# Patient Record
Sex: FEMALE | Race: WHITE | Employment: STUDENT | URBAN - METROPOLITAN AREA
[De-identification: names, ages, dates, MRNs, and addresses within clinical notes are randomized per-mention and may not be internally consistent; named-entity substitution may affect disease eponyms.]

---

## 2022-05-31 ENCOUNTER — OFFICE VISIT (OUTPATIENT)
Dept: GYNECOLOGY | Age: 22
End: 2022-05-31
Payer: COMMERCIAL

## 2022-05-31 VITALS
DIASTOLIC BLOOD PRESSURE: 90 MMHG | BODY MASS INDEX: 45.71 KG/M2 | SYSTOLIC BLOOD PRESSURE: 124 MMHG | WEIGHT: 258 LBS | HEIGHT: 63 IN

## 2022-05-31 DIAGNOSIS — N94.6 DYSMENORRHEA: ICD-10-CM

## 2022-05-31 DIAGNOSIS — N92.1 MENORRHAGIA WITH IRREGULAR CYCLE: Primary | ICD-10-CM

## 2022-05-31 DIAGNOSIS — Z30.016 ENCOUNTER FOR INITIAL PRESCRIPTION OF TRANSDERMAL PATCH HORMONAL CONTRACEPTIVE DEVICE: ICD-10-CM

## 2022-05-31 PROCEDURE — 99214 OFFICE O/P EST MOD 30 MIN: CPT | Performed by: OBSTETRICS & GYNECOLOGY

## 2022-05-31 RX ORDER — NORELGESTROMIN AND ETHINYL ESTRADIOL 150; 35 UG/D; UG/D
PATCH TRANSDERMAL
Qty: 3 PATCH | Refills: 3 | Status: SHIPPED | OUTPATIENT
Start: 2022-05-31 | End: 2022-07-07 | Stop reason: SDUPTHER

## 2022-05-31 NOTE — PROGRESS NOTES
Vision, Double Vision, Glaucoma, Visual Disturbances, Hearing Loss, Ringing in the Ears, Vertigo, Nose Bleed, Bleeding Gums, Hoarseness and Sore Throat. Neck: Not Present- Neck Pain and Neck Swelling. Respiratory: Not Present- Cough, Difficulty Breathing and Difficulty Breathing on Exertion. Breast: Not Present- Breast Mass, Breast Pain, Breast Swelling, Nipple Discharge, Nipple Pain, Recent Breast Size Changes and Skin Changes. Cardiovascular: Not Present- Abnormal Blood Pressure, Chest Pain, Edema, Fainting / Blacking Out, Palpitations, Shortness of Breath and Swelling of Extremities. Gastrointestinal: Not Present- Abdominal Pain, Abdominal Swelling, Bloating, Change in Bowel Habits, Constipation, Diarrhea, Difficulty Swallowing, Gets full quickly at meals, Nausea, Rectal Bleeding and Vomiting. Female Genitourinary: Not Present- Dysmenorrhea, Dyspareunia, Decreased libido, Excessive Menstrual Bleeding, Menstrual Irregularities, Pelvic Pain, Urinary Complaints, Vaginal Discharge, Vaginal itching/burning, Vaginal odor  Musculoskeletal: Not Present- Joint Pain and Muscle Pain. Neurological: Not Present- Dizziness, Fainting, Headaches and Seizures. Psychiatric: Not Present- Anxiety, Depression, Mood changes and Panic Attacks. Endocrine: Not Present- Appetite Changes, Cold Intolerance, Excessive Thirst, Excessive Urination and Heat Intolerance. Hematology: Not Present- Abnormal Bleeding, Easy Bruising and Enlarged Lymph Nodes. PHYSICAL EXAM:    BP (!) 124/90   Ht 5' 3\" (1.6 m)   Wt 258 lb (117 kg)   LMP 05/28/2022   BMI 45.70 kg/m²     Constitutional: Vital signs are normal. She appears well-developed and well-nourished. She is active and cooperative. Neurological: She is alert. Nursing note and vitals reviewed. Medical problems and test results were reviewed with the patient today. ASSESSMENT and PLAN    Kaela Rowell was seen today for contraception.     Diagnoses and all orders for this visit: Menorrhagia with irregular cycle    Dysmenorrhea    Encounter for initial prescription of transdermal patch hormonal contraceptive device  -     norelgestromin-ethinyl estradiol (Elgie Felton) 150-35 MCG/24HR; 1 patch once a week x 3 weeks off x 1 week              Time:  I spent  30 minutes in preparing to see patient (including chart review and preparation), obtaining and/or reviewing additional medical history, performing a physical exam and evaluation, documenting clinical information in the electronic health record, independently interpreting results, communicating results to patient, family or caregiver, and/or coordinating care. No follow-up provider specified.         Jay Corcoran MD

## 2022-07-07 DIAGNOSIS — Z30.016 ENCOUNTER FOR INITIAL PRESCRIPTION OF TRANSDERMAL PATCH HORMONAL CONTRACEPTIVE DEVICE: ICD-10-CM

## 2022-07-07 RX ORDER — NORELGESTROMIN AND ETHINYL ESTRADIOL 150; 35 UG/D; UG/D
PATCH TRANSDERMAL
Qty: 3 PATCH | Refills: 11 | Status: SHIPPED | OUTPATIENT
Start: 2022-07-07

## 2022-08-17 RX ORDER — NORETHINDRONE ACETATE AND ETHINYL ESTRADIOL 1.5-30(21)
1 KIT ORAL DAILY
Qty: 1 PACKET | Refills: 11 | Status: SHIPPED | OUTPATIENT
Start: 2022-08-17

## 2022-08-17 NOTE — TELEPHONE ENCOUNTER
Pt called was started on the birth control patches at last visit. She is c/o irregular bleeding and increased migraines. She is wanting to go back on the birth control pills. New prescription to pharmacy.

## 2022-11-27 ENCOUNTER — HOSPITAL ENCOUNTER (EMERGENCY)
Age: 22
Discharge: HOME | End: 2022-11-27
Attending: GENERAL PRACTICE
Payer: COMMERCIAL

## 2022-11-27 VITALS
RESPIRATION RATE: 18 BRPM | BODY MASS INDEX: 39.87 KG/M2 | HEIGHT: 63 IN | DIASTOLIC BLOOD PRESSURE: 90 MMHG | WEIGHT: 225 LBS | TEMPERATURE: 98.3 F | OXYGEN SATURATION: 99 % | SYSTOLIC BLOOD PRESSURE: 133 MMHG | HEART RATE: 88 BPM

## 2022-11-27 DIAGNOSIS — R07.89 ATYPICAL CHEST PAIN: Primary | ICD-10-CM

## 2022-11-27 DIAGNOSIS — F43.0 STRESS REACTION: ICD-10-CM

## 2022-11-27 LAB
EKG ATRIAL RATE: 82 BPM
EKG DIAGNOSIS: NORMAL
EKG P AXIS: 43 DEGREES
EKG P-R INTERVAL: 122 MS
EKG Q-T INTERVAL: 380 MS
EKG QRS DURATION: 90 MS
EKG QTC CALCULATION (BAZETT): 443 MS
EKG R AXIS: 47 DEGREES
EKG T AXIS: 23 DEGREES
EKG VENTRICULAR RATE: 82 BPM

## 2022-11-27 PROCEDURE — 93005 ELECTROCARDIOGRAM TRACING: CPT

## 2022-11-27 PROCEDURE — 99283 EMERGENCY DEPT VISIT LOW MDM: CPT | Performed by: GENERAL PRACTICE

## 2022-11-27 ASSESSMENT — PAIN - FUNCTIONAL ASSESSMENT: PAIN_FUNCTIONAL_ASSESSMENT: NONE - DENIES PAIN

## 2022-11-27 NOTE — ED TRIAGE NOTES
Patient to triage with c/o chest pain with panic attacks over the last week with vomiting, states she has been overly anxious over the week and chest hurts upon inspiration when having an attack.

## 2022-11-27 NOTE — ED PROVIDER NOTES
Emergency Department Provider Note                   PCP:                Antonella Soliman MD               Age: 22 y.o. Sex: female     No diagnosis found. DISPOSITION          MDM  Number of Diagnoses or Management Options  Atypical chest pain: new, needed workup  Stress reaction: new, needed workup  Diagnosis management comments: Patient presents with atypical chest pain. However, patient is not currently having any symptoms of chest pain or dyspnea. Patient admits to having panic attacks. I have considered cardiopulmonary emergencies such as ACS, PE, pneumonia, among other cardiopulmonary emergencies. Patient does not appear to have any emergent pathology at this time. Patient will be treated symptomatically and expectantly. Patient encouraged to follow-up with primary care and return precautions are given. Amount and/or Complexity of Data Reviewed  Independent visualization of images, tracings, or specimens: yes    Risk of Complications, Morbidity, and/or Mortality  Presenting problems: low  Diagnostic procedures: low  Management options: low    Patient Progress  Patient progress: stable             Orders Placed This Encounter   Procedures    EKG 12 Lead        Medications - No data to display    New Prescriptions    No medications on file        Anni Reed is a 22 y.o. female who presents to the Emergency Department with chief complaint of    Chief Complaint   Patient presents with    Panic Attack    Chest Pain      Patient presents with substernal chest pain that she has had off and on for a week. Patient states that she gets panic attacks and she starts breathing heavy and when she does that it hurts her chest.  She denies any radiation of the pain. She states that it is a dull ache in her substernal area without radiation to the neck or jaw or back. Patient currently does not have any chest pain or shortness of breath.   She does feel mildly anxious but not having any panic feeling. Patient does not feel depressed and does not have any suicidal ideations. Patient denies any palpitations, syncope, cough, or dyspnea on exertion. Past medical history otherwise negative. Patient does take birth control pills. Social history is negative for smoking or drinking. Review of Systems   All other systems reviewed and are negative. Past Medical History:   Diagnosis Date    Depression         History reviewed. No pertinent surgical history. Family History   Problem Relation Age of Onset    Diabetes Mother     Diabetes Maternal Grandfather     Heart Disease Maternal Grandfather         Social History     Socioeconomic History    Marital status: Single     Spouse name: None    Number of children: None    Years of education: None    Highest education level: None   Tobacco Use    Smoking status: Never    Smokeless tobacco: Never   Substance and Sexual Activity    Alcohol use: Never    Drug use: Never    Sexual activity: Not Currently     Birth control/protection: None         Patient has no known allergies. Previous Medications    NORELGESTROMIN-ETHINYL ESTRADIOL (XULANE) 150-35 MCG/24HR    1 patch once a week x 3 weeks off x 1 week    NORETHINDRONE-ETHINYL ESTRADIOL-IRON (LOESTRIN FE 1.5/30) 1.5-30 MG-MCG TABLET    Take 1 tablet by mouth daily    NORETHINDRONE-ETHINYL ESTRADIOL-IRON (MICROGESTIN FE1.5/30) 1.5-30 MG-MCG TABLET    Take 1 tablet by mouth daily        Vitals signs and nursing note reviewed. Patient Vitals for the past 4 hrs:   Temp Pulse Resp BP SpO2   11/27/22 0100 98.3 °F (36.8 °C) 88 18 (!) 133/90 99 %          Physical Exam  Constitutional:       General: She is not in acute distress. HENT:      Head: Normocephalic and atraumatic. Nose: Nose normal.      Mouth/Throat:      Mouth: Mucous membranes are moist.   Eyes:      Extraocular Movements: Extraocular movements intact. Pupils: Pupils are equal, round, and reactive to light.    Cardiovascular: Rate and Rhythm: Normal rate and regular rhythm. Heart sounds: Normal heart sounds. Pulmonary:      Effort: No respiratory distress. Breath sounds: No wheezing. Abdominal:      General: Abdomen is flat. Palpations: Abdomen is soft. Tenderness: There is no abdominal tenderness. Musculoskeletal:         General: No swelling or tenderness. Cervical back: Normal range of motion. Skin:     Findings: No erythema or rash. Neurological:      General: No focal deficit present. Mental Status: She is oriented to person, place, and time. Psychiatric:         Mood and Affect: Mood normal.         Behavior: Behavior normal.        EKG 12 Lead    Date/Time: 11/27/2022 1:03 AM  Performed by: Anusha Alonso DO  Authorized by: Anusha Alonso DO     ECG reviewed by ED Physician in the absence of a cardiologist: yes    Interpretation:     Interpretation: normal    Rate:     ECG rate:  82    ECG rate assessment: normal    Rhythm:     Rhythm: sinus rhythm    ST segments:     ST segments:  Normal    No results found for any visits on 11/27/22. No orders to display                       Voice dictation software was used during the making of this note. This software is not perfect and grammatical and other typographical errors may be present. This note has not been completely proofread for errors.      Anusha Alonso DO  11/27/22 6628

## 2022-11-27 NOTE — ED NOTES
I have reviewed discharge instructions with the patient. The patient verbalized understanding. Patient left ED via Discharge Method: ambulatory to Home with self  . Opportunity for questions and clarification provided. Patient given 0 scripts. To continue your aftercare when you leave the hospital, you may receive an automated call from our care team to check in on how you are doing. This is a free service and part of our promise to provide the best care and service to meet your aftercare needs.  If you have questions, or wish to unsubscribe from this service please call [phone]. Thank you for Choosing our [address] [place] [address] Emergency Department.        Anish Mcginnis RN  11/27/22 8330

## 2023-07-10 ENCOUNTER — OFFICE VISIT (OUTPATIENT)
Dept: OBGYN CLINIC | Age: 23
End: 2023-07-10
Payer: COMMERCIAL

## 2023-07-10 VITALS
HEIGHT: 63 IN | DIASTOLIC BLOOD PRESSURE: 84 MMHG | WEIGHT: 225 LBS | SYSTOLIC BLOOD PRESSURE: 122 MMHG | BODY MASS INDEX: 39.87 KG/M2

## 2023-07-10 DIAGNOSIS — Z12.4 CERVICAL CANCER SCREENING: ICD-10-CM

## 2023-07-10 DIAGNOSIS — R21 RASH: ICD-10-CM

## 2023-07-10 DIAGNOSIS — N92.1 MENORRHAGIA WITH IRREGULAR CYCLE: ICD-10-CM

## 2023-07-10 DIAGNOSIS — Z01.419 ENCOUNTER FOR WELL WOMAN EXAM WITH ROUTINE GYNECOLOGICAL EXAM: Primary | ICD-10-CM

## 2023-07-10 PROCEDURE — 99395 PREV VISIT EST AGE 18-39: CPT | Performed by: OBSTETRICS & GYNECOLOGY

## 2023-07-10 RX ORDER — NORETHINDRONE ACETATE AND ETHINYL ESTRADIOL 1.5-30(21)
1 KIT ORAL DAILY
Qty: 1 PACKET | Refills: 11 | Status: SHIPPED | OUTPATIENT
Start: 2023-07-10

## 2023-07-10 RX ORDER — FLUCONAZOLE 150 MG/1
TABLET ORAL
Qty: 2 TABLET | Refills: 2 | Status: SHIPPED | OUTPATIENT
Start: 2023-07-10

## 2023-07-10 RX ORDER — NORETHINDRONE ACETATE AND ETHINYL ESTRADIOL 1.5-30(21)
1 KIT ORAL DAILY
Qty: 1 PACKET | Refills: 11 | Status: SHIPPED | OUTPATIENT
Start: 2023-07-10 | End: 2023-07-10 | Stop reason: SDUPTHER

## 2023-07-10 RX ORDER — NORETHINDRONE ACETATE AND ETHINYL ESTRADIOL AND FERROUS FUMARATE 1.5-30(21)
KIT ORAL
Qty: 28 TABLET | Refills: 11 | OUTPATIENT
Start: 2023-07-10

## 2023-07-10 NOTE — PROGRESS NOTES
RADHA John Cancer is a 25 y.o. female seen for annual GYN exam.  She has a rash involving both nipples x several months. She is having clear drainage from the areola but nothing from the nipple. Past Medical History, Past Surgical History, Family history, Social History, and Medications were all reviewed with the patient today and updated as necessary. Current Outpatient Medications   Medication Sig    fluconazole (DIFLUCAN) 150 MG tablet 1 tablet now and repeat dose in 4 days    nystatin-triamcinolone (MYCOLOG II) 330351-8.1 UNIT/GM-% cream Apply topically 2 times daily. norethindrone-ethinyl estradiol-iron (LOESTRIN FE 1.5/30) 1.5-30 MG-MCG tablet Take 1 tablet by mouth daily     No current facility-administered medications for this visit. No Known Allergies  Past Medical History:   Diagnosis Date    Depression      History reviewed. No pertinent surgical history. Family History   Problem Relation Age of Onset    Diabetes Mother     Diabetes Maternal Grandfather     Heart Disease Maternal Grandfather       Social History     Tobacco Use    Smoking status: Never    Smokeless tobacco: Never   Substance Use Topics    Alcohol use: Never       Social History     Substance and Sexual Activity   Sexual Activity Not Currently    Birth control/protection: Pill     OB History    Para Term  AB Living   0 0 0 0 0 0   SAB IAB Ectopic Molar Multiple Live Births   0 0 0 0 0 0       Health Maintenance  Pap smear:first pap      Review of Systems  General: Not Present- Chills, Fever, Fatigue, Insomnia, Hot flashes/Night sweats, Weight gain  Skin: Not Present- Bruising, Change in Wart/Mole, Excessive Sweating, Itching, Nail Changes, New Lesions, Rash, Skin Color Changes and Ulcer. HEENT: Not Present- Headache, Blurred Vision, Double Vision, Glaucoma, Visual Disturbances, Hearing Loss, Ringing in the Ears, Vertigo, Nose Bleed, Bleeding Gums, Hoarseness and Sore Throat.   Neck: Not Present- Neck

## 2023-07-12 LAB
CYTOLOGIST CVX/VAG CYTO: NORMAL
CYTOLOGY CVX/VAG DOC THIN PREP: NORMAL
HPV REFLEX: NORMAL
Lab: NORMAL
PATH REPORT.FINAL DX SPEC: NORMAL
STAT OF ADQ CVX/VAG CYTO-IMP: NORMAL

## 2024-05-14 DIAGNOSIS — N92.1 MENORRHAGIA WITH IRREGULAR CYCLE: ICD-10-CM

## 2024-05-14 RX ORDER — NORETHINDRONE ACETATE AND ETHINYL ESTRADIOL 1.5-30(21)
1 KIT ORAL DAILY
Qty: 1 PACKET | Refills: 3 | Status: SHIPPED | OUTPATIENT
Start: 2024-05-14

## 2024-08-07 NOTE — PROGRESS NOTES
RADHA Walker is a 23 y.o. female seen for annual GYN exam.   She has had 4 episodes of bleeding this month.  She has not missed any pills.  She did 2 HPTs that were negative    Past Medical History, Past Surgical History, Family history, Social History, and Medications were all reviewed with the patient today and updated as necessary.     Current Outpatient Medications   Medication Sig    norethindrone-ethinyl estradiol-iron (LOESTRIN FE 1.5/30) 1.5-30 MG-MCG tablet Take 1 tablet by mouth daily    fluconazole (DIFLUCAN) 150 MG tablet 1 tablet now and repeat dose in 4 days (Patient not taking: Reported on 2024)    nystatin-triamcinolone (MYCOLOG II) 804330-8.1 UNIT/GM-% cream Apply topically 2 times daily. (Patient not taking: Reported on 2024)     No current facility-administered medications for this visit.     No Known Allergies  Past Medical History:   Diagnosis Date    Depression      History reviewed. No pertinent surgical history.  Family History   Problem Relation Age of Onset    Diabetes Mother     Diabetes Maternal Grandfather     Heart Disease Maternal Grandfather       Social History     Tobacco Use    Smoking status: Never    Smokeless tobacco: Never   Substance Use Topics    Alcohol use: Never       Social History     Substance and Sexual Activity   Sexual Activity Yes    Partners: Male    Birth control/protection: Pill     OB History    Para Term  AB Living   0 0 0 0 0 0   SAB IAB Ectopic Molar Multiple Live Births   0 0 0 0 0 0       Health Maintenance    Pap smear: 7/10/23      Review of Systems  General: Not Present- Fatigue, Insomnia, Hot flashes/Night sweats, Weight gain, Brain fog  Breast: Not Present- Breast Mass, Breast Pain, Breast Swelling, Nipple Discharge, Nipple Pain, Recent Breast Size Changes and Skin Changes.  Gastrointestinal: Not Present- Abdominal Pain,  Bloating, Constipation, Diarrhea, Nausea, Rectal bleeding  Female Genitourinary: Not Present-

## 2024-08-13 ENCOUNTER — OFFICE VISIT (OUTPATIENT)
Dept: OBGYN CLINIC | Age: 24
End: 2024-08-13
Payer: COMMERCIAL

## 2024-08-13 VITALS
SYSTOLIC BLOOD PRESSURE: 120 MMHG | DIASTOLIC BLOOD PRESSURE: 78 MMHG | BODY MASS INDEX: 44.12 KG/M2 | HEIGHT: 63 IN | WEIGHT: 249 LBS

## 2024-08-13 DIAGNOSIS — Z01.419 ENCOUNTER FOR WELL WOMAN EXAM WITH ROUTINE GYNECOLOGICAL EXAM: Primary | ICD-10-CM

## 2024-08-13 DIAGNOSIS — N93.9 ABNORMAL UTERINE BLEEDING (AUB): ICD-10-CM

## 2024-08-13 DIAGNOSIS — Z12.4 CERVICAL CANCER SCREENING: ICD-10-CM

## 2024-08-13 DIAGNOSIS — N92.1 MENORRHAGIA WITH IRREGULAR CYCLE: ICD-10-CM

## 2024-08-13 PROCEDURE — 99395 PREV VISIT EST AGE 18-39: CPT | Performed by: OBSTETRICS & GYNECOLOGY

## 2024-08-13 PROCEDURE — 99459 PELVIC EXAMINATION: CPT | Performed by: OBSTETRICS & GYNECOLOGY

## 2024-08-13 RX ORDER — NORETHINDRONE ACETATE AND ETHINYL ESTRADIOL 1.5-30(21)
1 KIT ORAL DAILY
Qty: 3 PACKET | Refills: 4 | Status: SHIPPED | OUTPATIENT
Start: 2024-08-13

## 2024-08-16 LAB
C TRACH RRNA CVX QL NAA+PROBE: NEGATIVE
COLLECTION METHOD: NORMAL
CYTOLOGIST CVX/VAG CYTO: NORMAL
CYTOLOGY CVX/VAG DOC THIN PREP: NORMAL
DATE OF LMP: NORMAL
HPV REFLEX: NORMAL
Lab: NORMAL
N GONORRHOEA RRNA CVX QL NAA+PROBE: NEGATIVE
OTHER PT INFO: NORMAL
PAP SOURCE: NORMAL
PATH REPORT.FINAL DX SPEC: NORMAL
PREV TREATMENT: NORMAL
STAT OF ADQ CVX/VAG CYTO-IMP: NORMAL

## 2024-09-12 ENCOUNTER — HOSPITAL ENCOUNTER (EMERGENCY)
Age: 24
Discharge: HOME | End: 2024-09-12
Attending: EMERGENCY MEDICINE
Payer: COMMERCIAL

## 2024-09-12 VITALS
RESPIRATION RATE: 16 BRPM | SYSTOLIC BLOOD PRESSURE: 133 MMHG | WEIGHT: 215 LBS | OXYGEN SATURATION: 100 % | BODY MASS INDEX: 39.56 KG/M2 | TEMPERATURE: 98.2 F | DIASTOLIC BLOOD PRESSURE: 83 MMHG | HEIGHT: 62 IN | HEART RATE: 99 BPM

## 2024-09-12 DIAGNOSIS — R11.2 NAUSEA AND VOMITING, UNSPECIFIED VOMITING TYPE: ICD-10-CM

## 2024-09-12 DIAGNOSIS — U07.1 COVID-19 VIRUS INFECTION: Primary | ICD-10-CM

## 2024-09-12 LAB
ALBUMIN SERPL-MCNC: 3.8 G/DL (ref 3.5–5)
ALBUMIN/GLOB SERPL: 1 (ref 1–1.9)
ALP SERPL-CCNC: 97 U/L (ref 35–104)
ALT SERPL-CCNC: 40 U/L (ref 12–65)
ANION GAP SERPL CALC-SCNC: 13 MMOL/L (ref 9–18)
AST SERPL-CCNC: 38 U/L (ref 15–37)
BILIRUB SERPL-MCNC: 1 MG/DL (ref 0–1.2)
BILIRUB UR QL: NEGATIVE
BUN SERPL-MCNC: 13 MG/DL (ref 6–23)
CALCIUM SERPL-MCNC: 9.4 MG/DL (ref 8.8–10.2)
CHLORIDE SERPL-SCNC: 102 MMOL/L (ref 98–107)
CO2 SERPL-SCNC: 22 MMOL/L (ref 20–28)
CREAT SERPL-MCNC: 0.79 MG/DL (ref 0.6–1.1)
FLUAV RNA SPEC QL NAA+PROBE: NOT DETECTED
FLUBV RNA SPEC QL NAA+PROBE: NOT DETECTED
GLOBULIN SER CALC-MCNC: 3.7 G/DL (ref 2.3–3.5)
GLUCOSE SERPL-MCNC: 91 MG/DL (ref 70–99)
GLUCOSE UR QL STRIP.AUTO: NEGATIVE MG/DL
HCG UR QL: NEGATIVE
KETONES UR-MCNC: NEGATIVE MG/DL
LEUKOCYTE ESTERASE UR QL STRIP: NEGATIVE
LIPASE SERPL-CCNC: 18 U/L (ref 13–60)
NITRITE UR QL: NEGATIVE
PH UR: 7.5 (ref 5–9)
POTASSIUM SERPL-SCNC: 4.6 MMOL/L (ref 3.5–5.1)
PROT SERPL-MCNC: 7.5 G/DL (ref 6.3–8.2)
PROT UR QL: NEGATIVE MG/DL
RBC # UR STRIP: ABNORMAL
SARS-COV-2 RDRP RESP QL NAA+PROBE: DETECTED
SERVICE CMNT-IMP: ABNORMAL
SODIUM SERPL-SCNC: 137 MMOL/L (ref 136–145)
SOURCE: ABNORMAL
SP GR UR: 1.02 (ref 1–1.02)
UROBILINOGEN UR QL: 0.2 EU/DL (ref 0.2–1)

## 2024-09-12 PROCEDURE — 87635 SARS-COV-2 COVID-19 AMP PRB: CPT

## 2024-09-12 PROCEDURE — 87502 INFLUENZA DNA AMP PROBE: CPT

## 2024-09-12 PROCEDURE — 96375 TX/PRO/DX INJ NEW DRUG ADDON: CPT

## 2024-09-12 PROCEDURE — 2580000003 HC RX 258: Performed by: EMERGENCY MEDICINE

## 2024-09-12 PROCEDURE — 96374 THER/PROPH/DIAG INJ IV PUSH: CPT

## 2024-09-12 PROCEDURE — 99284 EMERGENCY DEPT VISIT MOD MDM: CPT

## 2024-09-12 PROCEDURE — 83690 ASSAY OF LIPASE: CPT

## 2024-09-12 PROCEDURE — 81003 URINALYSIS AUTO W/O SCOPE: CPT

## 2024-09-12 PROCEDURE — 96361 HYDRATE IV INFUSION ADD-ON: CPT

## 2024-09-12 PROCEDURE — 6360000002 HC RX W HCPCS: Performed by: EMERGENCY MEDICINE

## 2024-09-12 PROCEDURE — 80053 COMPREHEN METABOLIC PANEL: CPT

## 2024-09-12 PROCEDURE — 81025 URINE PREGNANCY TEST: CPT

## 2024-09-12 RX ORDER — ONDANSETRON 4 MG/1
4 TABLET, ORALLY DISINTEGRATING ORAL 3 TIMES DAILY PRN
Qty: 9 TABLET | Refills: 0 | Status: SHIPPED | OUTPATIENT
Start: 2024-09-12

## 2024-09-12 RX ORDER — SODIUM CHLORIDE, SODIUM LACTATE, POTASSIUM CHLORIDE, AND CALCIUM CHLORIDE .6; .31; .03; .02 G/100ML; G/100ML; G/100ML; G/100ML
1000 INJECTION, SOLUTION INTRAVENOUS ONCE
Status: COMPLETED | OUTPATIENT
Start: 2024-09-12 | End: 2024-09-12

## 2024-09-12 RX ORDER — KETOROLAC TROMETHAMINE 15 MG/ML
15 INJECTION, SOLUTION INTRAMUSCULAR; INTRAVENOUS
Status: COMPLETED | OUTPATIENT
Start: 2024-09-12 | End: 2024-09-12

## 2024-09-12 RX ORDER — ONDANSETRON 2 MG/ML
4 INJECTION INTRAMUSCULAR; INTRAVENOUS
Status: COMPLETED | OUTPATIENT
Start: 2024-09-12 | End: 2024-09-12

## 2024-09-12 RX ORDER — IBUPROFEN 800 MG/1
800 TABLET, FILM COATED ORAL
Qty: 15 TABLET | Refills: 0 | Status: SHIPPED | OUTPATIENT
Start: 2024-09-12 | End: 2024-09-17

## 2024-09-12 RX ADMIN — KETOROLAC TROMETHAMINE 15 MG: 15 INJECTION, SOLUTION INTRAMUSCULAR; INTRAVENOUS at 09:20

## 2024-09-12 RX ADMIN — ONDANSETRON 4 MG: 2 INJECTION, SOLUTION INTRAMUSCULAR; INTRAVENOUS at 09:20

## 2024-09-12 RX ADMIN — SODIUM CHLORIDE, POTASSIUM CHLORIDE, SODIUM LACTATE AND CALCIUM CHLORIDE 1000 ML: 600; 310; 30; 20 INJECTION, SOLUTION INTRAVENOUS at 09:19

## 2024-09-12 ASSESSMENT — PAIN SCALES - GENERAL: PAINLEVEL_OUTOF10: 7

## 2024-09-12 ASSESSMENT — ENCOUNTER SYMPTOMS
COUGH: 0
NAUSEA: 1
DIARRHEA: 0
VOMITING: 1
ABDOMINAL PAIN: 1
BACK PAIN: 1
SHORTNESS OF BREATH: 0

## 2024-09-12 ASSESSMENT — PAIN DESCRIPTION - LOCATION: LOCATION: GENERALIZED

## 2024-09-12 ASSESSMENT — PAIN - FUNCTIONAL ASSESSMENT: PAIN_FUNCTIONAL_ASSESSMENT: 0-10

## 2024-09-24 PROBLEM — Z11.3 SCREEN FOR STD (SEXUALLY TRANSMITTED DISEASE): Status: ACTIVE | Noted: 2024-09-24

## 2024-09-24 PROBLEM — Z12.4 CERVICAL CANCER SCREENING: Status: ACTIVE | Noted: 2024-09-24

## 2024-10-16 NOTE — PROGRESS NOTES
RADHA Walker is a 23 y.o. female seen for follow up irregular periods.  She has not had a period in 2 months.  She had US today.  She has started back on bcps.      Past Medical History, Past Surgical History, Family history, Social History, and Medications were all reviewed with the patient today and updated as necessary.     Current Outpatient Medications   Medication Sig    norethindrone-ethinyl estradiol-iron (LOESTRIN FE 1.5/30) 1.5-30 MG-MCG tablet Take 1 tablet by mouth daily    ibuprofen (ADVIL;MOTRIN) 800 MG tablet Take 1 tablet by mouth 3 times daily (with meals) for 5 days (Patient not taking: Reported on 10/21/2024)    ondansetron (ZOFRAN-ODT) 4 MG disintegrating tablet Take 1 tablet by mouth 3 times daily as needed for Nausea or Vomiting (Patient not taking: Reported on 10/21/2024)    fluconazole (DIFLUCAN) 150 MG tablet 1 tablet now and repeat dose in 4 days (Patient not taking: Reported on 2024)    nystatin-triamcinolone (MYCOLOG II) 624426-5.1 UNIT/GM-% cream Apply topically 2 times daily. (Patient not taking: Reported on 2024)     No current facility-administered medications for this visit.     No Known Allergies  Past Medical History:   Diagnosis Date    Depression     Fibroid      History reviewed. No pertinent surgical history.  Family History   Problem Relation Age of Onset    Diabetes Mother     Diabetes Maternal Grandfather     Heart Disease Maternal Grandfather       Social History     Tobacco Use    Smoking status: Never    Smokeless tobacco: Never   Substance Use Topics    Alcohol use: Never       Social History     Substance and Sexual Activity   Sexual Activity Yes    Partners: Male    Birth control/protection: Pill     OB History    Para Term  AB Living   0 0 0 0 0 0   SAB IAB Ectopic Molar Multiple Live Births   0 0 0 0 0 0       Health Maintenance  Mammogram:   Colonoscopy:   Bone Density:    ROS:  Review of Systems  General: Not Present- Fatigue,

## 2024-10-21 ENCOUNTER — OFFICE VISIT (OUTPATIENT)
Dept: OBGYN CLINIC | Age: 24
End: 2024-10-21
Payer: COMMERCIAL

## 2024-10-21 VITALS — DIASTOLIC BLOOD PRESSURE: 88 MMHG | SYSTOLIC BLOOD PRESSURE: 122 MMHG | WEIGHT: 243 LBS | BODY MASS INDEX: 44.45 KG/M2

## 2024-10-21 DIAGNOSIS — N92.6 IRREGULAR PERIODS: Primary | ICD-10-CM

## 2024-10-21 DIAGNOSIS — D21.9 FIBROID: ICD-10-CM

## 2024-10-21 PROCEDURE — 76830 TRANSVAGINAL US NON-OB: CPT | Performed by: OBSTETRICS & GYNECOLOGY

## 2024-10-21 PROCEDURE — 99214 OFFICE O/P EST MOD 30 MIN: CPT | Performed by: OBSTETRICS & GYNECOLOGY

## 2024-10-24 PROBLEM — Z12.4 CERVICAL CANCER SCREENING: Status: RESOLVED | Noted: 2024-09-24 | Resolved: 2024-10-24

## 2024-10-24 PROBLEM — Z11.3 SCREEN FOR STD (SEXUALLY TRANSMITTED DISEASE): Status: RESOLVED | Noted: 2024-09-24 | Resolved: 2024-10-24

## 2024-11-11 DIAGNOSIS — N92.1 MENORRHAGIA WITH IRREGULAR CYCLE: ICD-10-CM

## 2024-11-11 RX ORDER — NORETHINDRONE ACETATE AND ETHINYL ESTRADIOL AND FERROUS FUMARATE 1.5-30(21)
1 KIT ORAL DAILY
Qty: 28 TABLET | Refills: 3 | OUTPATIENT
Start: 2024-11-11

## 2025-02-11 ENCOUNTER — HOSPITAL ENCOUNTER (EMERGENCY)
Age: 25
Discharge: HOME | End: 2025-02-11
Payer: COMMERCIAL

## 2025-02-11 VITALS
OXYGEN SATURATION: 98 % | RESPIRATION RATE: 16 BRPM | DIASTOLIC BLOOD PRESSURE: 83 MMHG | WEIGHT: 200 LBS | BODY MASS INDEX: 36.8 KG/M2 | SYSTOLIC BLOOD PRESSURE: 128 MMHG | TEMPERATURE: 97.7 F | HEART RATE: 83 BPM | HEIGHT: 62 IN

## 2025-02-11 DIAGNOSIS — J06.9 VIRAL URI: Primary | ICD-10-CM

## 2025-02-11 LAB
FLUAV RNA SPEC QL NAA+PROBE: NOT DETECTED
FLUBV RNA SPEC QL NAA+PROBE: NOT DETECTED
SARS-COV-2 RDRP RESP QL NAA+PROBE: NOT DETECTED
SOURCE: NORMAL
STREP, MOLECULAR: NOT DETECTED

## 2025-02-11 PROCEDURE — 87635 SARS-COV-2 COVID-19 AMP PRB: CPT

## 2025-02-11 PROCEDURE — 87651 STREP A DNA AMP PROBE: CPT

## 2025-02-11 PROCEDURE — 99283 EMERGENCY DEPT VISIT LOW MDM: CPT

## 2025-02-11 PROCEDURE — 87502 INFLUENZA DNA AMP PROBE: CPT

## 2025-02-11 ASSESSMENT — ENCOUNTER SYMPTOMS
DIARRHEA: 0
COUGH: 1
ABDOMINAL PAIN: 0
NAUSEA: 0
SORE THROAT: 1
VOMITING: 0

## 2025-02-11 ASSESSMENT — PAIN - FUNCTIONAL ASSESSMENT: PAIN_FUNCTIONAL_ASSESSMENT: 0-10

## 2025-02-11 ASSESSMENT — PAIN SCALES - GENERAL: PAINLEVEL_OUTOF10: 6

## 2025-02-11 ASSESSMENT — PAIN DESCRIPTION - LOCATION: LOCATION: THROAT

## 2025-02-11 NOTE — DISCHARGE INSTRUCTIONS
Your viral testing was negative.  Strep testing is also negative.  You likely have another virus that we do not test for.  Use Tylenol or ibuprofen as needed for pain or bodyaches.  Use over-the-counter medications for congestion and cough.  Follow-up with your PCP for recheck.

## 2025-02-11 NOTE — ED TRIAGE NOTES
Pt with sore throat and nasal congestion x 1 day. 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

## 2025-02-11 NOTE — ED PROVIDER NOTES
canal and external ear normal.      Nose: Congestion present.      Mouth/Throat:      Mouth: Mucous membranes are moist.      Pharynx: Posterior oropharyngeal erythema present. No oropharyngeal exudate.   Eyes:      Extraocular Movements: Extraocular movements intact.      Pupils: Pupils are equal, round, and reactive to light.   Cardiovascular:      Rate and Rhythm: Normal rate and regular rhythm.      Pulses: Normal pulses.      Heart sounds: Normal heart sounds.   Pulmonary:      Effort: Pulmonary effort is normal.      Breath sounds: Normal breath sounds.   Neurological:      General: No focal deficit present.      Mental Status: She is oriented to person, place, and time.   Psychiatric:         Mood and Affect: Mood normal.         Behavior: Behavior normal.        Procedures     Procedures    Orders placed during this emergency department visit:     Orders Placed This Encounter   Procedures    COVID-19, Rapid    Group A Strep Screen By PCR    Influenza A/B, Molecular        Medications given during this emergency department visit:   Medications - No data to display    New prescriptions:     Discharge Medication List as of 2/11/2025 10:41 AM           Past History and Complexity:     Past Medical History:   Diagnosis Date    Depression     Fibroid         History reviewed. No pertinent surgical history.     Social History     Socioeconomic History    Marital status: Single     Spouse name: None    Number of children: None    Years of education: None    Highest education level: None   Tobacco Use    Smoking status: Never    Smokeless tobacco: Never   Vaping Use    Vaping status: Some Days    Substances: Nicotine    Devices: Disposable   Substance and Sexual Activity    Alcohol use: Never    Drug use: Never    Sexual activity: Yes     Partners: Male     Birth control/protection: Pill     Social Determinants of Health     Social Connections: Unknown (3/19/2021)    [facility]     No